# Patient Record
Sex: MALE | Race: WHITE
[De-identification: names, ages, dates, MRNs, and addresses within clinical notes are randomized per-mention and may not be internally consistent; named-entity substitution may affect disease eponyms.]

---

## 2018-02-09 ENCOUNTER — HOSPITAL ENCOUNTER (INPATIENT)
Dept: HOSPITAL 12 - ER | Age: 63
LOS: 3 days | Discharge: NURSING, REHAB OR LONG-TERM CARE | DRG: 871 | End: 2018-02-12
Payer: MEDICARE

## 2018-02-09 VITALS — WEIGHT: 294.02 LBS | HEIGHT: 70 IN | BODY MASS INDEX: 42.09 KG/M2

## 2018-02-09 VITALS — DIASTOLIC BLOOD PRESSURE: 54 MMHG | SYSTOLIC BLOOD PRESSURE: 127 MMHG

## 2018-02-09 DIAGNOSIS — I25.2: ICD-10-CM

## 2018-02-09 DIAGNOSIS — E86.9: ICD-10-CM
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
## 2018-02-09 DIAGNOSIS — Z79.84: ICD-10-CM

## 2018-02-09 DIAGNOSIS — K44.9: ICD-10-CM

## 2018-02-09 DIAGNOSIS — K21.9: ICD-10-CM

## 2018-02-09 DIAGNOSIS — M50.30: ICD-10-CM

## 2018-02-09 DIAGNOSIS — E87.3: ICD-10-CM

## 2018-02-09 DIAGNOSIS — Z59.0: ICD-10-CM

## 2018-02-09 DIAGNOSIS — E78.5: ICD-10-CM

## 2018-02-09 DIAGNOSIS — Z95.1: ICD-10-CM

## 2018-02-09 DIAGNOSIS — I25.10: ICD-10-CM

## 2018-02-09 DIAGNOSIS — Z79.899: ICD-10-CM

## 2018-02-09 DIAGNOSIS — B95.1: ICD-10-CM

## 2018-02-09 DIAGNOSIS — K80.20: ICD-10-CM

## 2018-02-09 DIAGNOSIS — G93.41: ICD-10-CM

## 2018-02-09 DIAGNOSIS — M25.78: ICD-10-CM

## 2018-02-09 DIAGNOSIS — E11.65: ICD-10-CM

## 2018-02-09 DIAGNOSIS — D64.9: ICD-10-CM

## 2018-02-09 DIAGNOSIS — N18.3: ICD-10-CM

## 2018-02-09 DIAGNOSIS — I13.10: ICD-10-CM

## 2018-02-09 DIAGNOSIS — E11.22: ICD-10-CM

## 2018-02-09 DIAGNOSIS — E66.01: ICD-10-CM

## 2018-02-09 DIAGNOSIS — A40.1: Primary | ICD-10-CM

## 2018-02-09 DIAGNOSIS — M47.9: ICD-10-CM

## 2018-02-09 DIAGNOSIS — R09.02: ICD-10-CM

## 2018-02-09 DIAGNOSIS — L03.116: ICD-10-CM

## 2018-02-09 DIAGNOSIS — Z79.82: ICD-10-CM
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
## 2018-02-09 DIAGNOSIS — M48.02: ICD-10-CM

## 2018-02-09 DIAGNOSIS — N17.0: ICD-10-CM

## 2018-02-09 LAB
ALP SERPL-CCNC: 86 U/L (ref 50–136)
ALT SERPL W/O P-5'-P-CCNC: 16 U/L (ref 16–63)
AMORPH URATE CRY URNS QL MICRO: (no result) /HPF
AMPHETAMINES UR QL SCN>1000 NG/ML: NEGATIVE
APAP SERPL-MCNC: < 2 UG/ML (ref 10–30)
APPEARANCE UR: (no result)
AST SERPL-CCNC: 14 U/L (ref 15–37)
BARBITURATES UR QL SCN: NEGATIVE
BASE EXCESS BLDA CALC-SCNC: 3.8 MMOL/L
BASOPHILS # BLD AUTO: 0 K/UL (ref 0–8)
BASOPHILS NFR BLD AUTO: 0.3 % (ref 0–2)
BILIRUB DIRECT SERPL-MCNC: 0.1 MG/DL (ref 0–0.2)
BILIRUB SERPL-MCNC: 0.4 MG/DL (ref 0.2–1)
BILIRUB UR QL STRIP: NEGATIVE
BUN SERPL-MCNC: 37 MG/DL (ref 7–18)
CHLORIDE SERPL-SCNC: 100 MMOL/L (ref 98–107)
CO2 SERPL-SCNC: 31 MMOL/L (ref 21–32)
COARSE GRAN CASTS URNS QL MICRO: (no result) /LPF
COCAINE UR QL SCN: NEGATIVE
COLOR UR: YELLOW
CREAT SERPL-MCNC: 1.9 MG/DL (ref 0.6–1.3)
DEPRECATED SQUAMOUS URNS QL MICRO: (no result) /HPF
EOSINOPHIL # BLD AUTO: 0.1 K/UL (ref 0–0.7)
EOSINOPHIL NFR BLD AUTO: 0.5 % (ref 0–7)
ETHANOL SERPL-MCNC: < 3 MG/DL (ref 0–0)
GLUCOSE SERPL-MCNC: 259 MG/DL (ref 74–106)
GLUCOSE UR STRIP-MCNC: (no result) MG/DL
HCO3 BLDA-SCNC: 27.1 MMOL/L
HCT VFR BLD AUTO: 31.7 % (ref 36.7–47.1)
HGB BLD-MCNC: 10.4 G/DL (ref 12.5–16.3)
HGB BLDA OXIMETRY-MCNC: 10.8 G/DL (ref 13.5–18)
INHALED O2 CONCENTRATION: 21 %
KETONES UR STRIP-MCNC: NEGATIVE MG/DL
LEUKOCYTE ESTERASE UR QL STRIP: NEGATIVE
LYMPHOCYTES # BLD AUTO: 0.3 K/UL (ref 20–40)
LYMPHOCYTES NFR BLD AUTO: 2.3 % (ref 20.5–51.5)
MCH RBC QN AUTO: 26.5 UUG (ref 23.8–33.4)
MCHC RBC AUTO-ENTMCNC: 33 G/DL (ref 32.5–36.3)
MCV RBC AUTO: 80.9 FL (ref 73–96.2)
MONOCYTES # BLD AUTO: 0.5 K/UL (ref 2–10)
MONOCYTES NFR BLD AUTO: 3.5 % (ref 0–11)
MUCOUS THREADS URNS QL MICRO: (no result) /LPF
NEUTROPHILS # BLD AUTO: 14 K/UL (ref 1.8–8.9)
NEUTROPHILS NFR BLD AUTO: 93.4 % (ref 38.5–71.5)
NITRITE UR QL STRIP: NEGATIVE
OPIATES UR QL SCN: NEGATIVE
PCO2 TEMP ADJ BLDA: 36 MMHG (ref 35–45)
PCP UR QL SCN>25 NG/ML: NEGATIVE
PH TEMP ADJ BLDA: 7.5 [PH] (ref 7.35–7.45)
PH UR STRIP: 5.5 [PH] (ref 5–8)
PLATELET # BLD AUTO: 216 K/UL (ref 152–348)
PO2 TEMP ADJ BLDA: 53.9 MMHG (ref 75–100)
POTASSIUM SERPL-SCNC: 4.2 MMOL/L (ref 3.5–5.1)
PROT UR QL STRIP: (no result)
RBC # BLD AUTO: 3.92 MIL/UL (ref 4.06–5.63)
RBC #/AREA URNS HPF: (no result) /HPF (ref 0–3)
SP GR UR STRIP: 1.01 (ref 1–1.03)
SPECIMEN DRAWN FROM PATIENT: (no result)
THC UR QL SCN>50 NG/ML: NEGATIVE
TSH SERPL DL<=0.005 MIU/L-ACNC: 1.02 MIU/ML (ref 0.36–3.74)
UROBILINOGEN UR STRIP-MCNC: 0.2 E.U./DL
WBC # BLD AUTO: 15 K/UL (ref 3.6–10.2)
WBC #/AREA URNS HPF: (no result) /HPF (ref 0–3)
WS STN SPEC: 7.1 G/DL (ref 6.4–8.2)

## 2018-02-09 PROCEDURE — G0480 DRUG TEST DEF 1-7 CLASSES: HCPCS

## 2018-02-09 PROCEDURE — C9113 INJ PANTOPRAZOLE SODIUM, VIA: HCPCS

## 2018-02-09 PROCEDURE — A4663 DIALYSIS BLOOD PRESSURE CUFF: HCPCS

## 2018-02-09 SDOH — ECONOMIC STABILITY - HOUSING INSECURITY: HOMELESSNESS: Z59.0

## 2018-02-09 NOTE — NUR
RECEIVED PT TO TELE FLOOR. PATIENT IS A&OX3 AT THIS TIME.  IT IS DIFFICULT TO COMPLETE 
ADMISSION INTERVIEW QUESTIONS DUE TO PATIENT SPEAKING AND UNABLE TO FINISH HIS SENTENCES. 
PATIENT HAS SLIGHT CONFUSION, UNABLE TO ANSWER QUESTIONS, BUT KNOWS HIS NAME AND BIRTHDAY 
AND WHERE HE IS.
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
## 2018-02-10 VITALS — DIASTOLIC BLOOD PRESSURE: 64 MMHG | SYSTOLIC BLOOD PRESSURE: 143 MMHG

## 2018-02-10 VITALS — SYSTOLIC BLOOD PRESSURE: 144 MMHG | DIASTOLIC BLOOD PRESSURE: 63 MMHG

## 2018-02-10 VITALS — SYSTOLIC BLOOD PRESSURE: 118 MMHG | DIASTOLIC BLOOD PRESSURE: 57 MMHG

## 2018-02-10 VITALS — DIASTOLIC BLOOD PRESSURE: 49 MMHG | SYSTOLIC BLOOD PRESSURE: 104 MMHG

## 2018-02-10 LAB
BASOPHILS # BLD AUTO: 0 K/UL (ref 0–8)
BASOPHILS NFR BLD AUTO: 0.2 % (ref 0–2)
BUN SERPL-MCNC: 33 MG/DL (ref 7–18)
CHLORIDE SERPL-SCNC: 101 MMOL/L (ref 98–107)
CHOLEST SERPL-MCNC: 119 MG/DL (ref ?–200)
CO2 SERPL-SCNC: 30 MMOL/L (ref 21–32)
CREAT SERPL-MCNC: 2 MG/DL (ref 0.6–1.3)
EOSINOPHIL # BLD AUTO: 0 K/UL (ref 0–0.7)
EOSINOPHIL NFR BLD AUTO: 0 % (ref 0–7)
GLUCOSE SERPL-MCNC: 173 MG/DL (ref 74–106)
HCT VFR BLD AUTO: 30.8 % (ref 36.7–47.1)
HDLC SERPL-MCNC: 40 MG/DL (ref 40–60)
HGB BLD-MCNC: 10 G/DL (ref 12.5–16.3)
LYMPHOCYTES # BLD AUTO: 0.4 K/UL (ref 20–40)
LYMPHOCYTES NFR BLD AUTO: 1.9 % (ref 20.5–51.5)
MAGNESIUM SERPL-MCNC: 1.3 MG/DL (ref 1.8–2.4)
MCH RBC QN AUTO: 26.2 UUG (ref 23.8–33.4)
MCHC RBC AUTO-ENTMCNC: 32 G/DL (ref 32.5–36.3)
MCV RBC AUTO: 81.2 FL (ref 73–96.2)
MONOCYTES # BLD AUTO: 0.9 K/UL (ref 2–10)
MONOCYTES NFR BLD AUTO: 3.8 % (ref 0–11)
NEUTROPHILS # BLD AUTO: 21.5 K/UL (ref 1.8–8.9)
NEUTROPHILS NFR BLD AUTO: 94.1 % (ref 38.5–71.5)
PHOSPHATE SERPL-MCNC: 2.7 MG/DL (ref 2.5–4.9)
PLATELET # BLD AUTO: 217 K/UL (ref 152–348)
POTASSIUM SERPL-SCNC: 4.3 MMOL/L (ref 3.5–5.1)
RBC # BLD AUTO: 3.8 MIL/UL (ref 4.06–5.63)
TRIGL SERPL-MCNC: 83 MG/DL (ref 30–150)
TSH SERPL DL<=0.005 MIU/L-ACNC: 0.47 MIU/ML (ref 0.36–3.74)
WBC # BLD AUTO: 22.8 K/UL (ref 3.6–10.2)

## 2018-02-10 RX ADMIN — ACETAMINOPHEN PRN MG: 325 TABLET ORAL at 16:29

## 2018-02-10 RX ADMIN — DEXTROSE AND SODIUM CHLORIDE PRN MLS/HR: 5; .45 INJECTION, SOLUTION INTRAVENOUS at 00:26

## 2018-02-10 RX ADMIN — MAGNESIUM SULFATE IN DEXTROSE SCH MLS/HR: 10 INJECTION, SOLUTION INTRAVENOUS at 13:39

## 2018-02-10 RX ADMIN — MAGNESIUM SULFATE IN DEXTROSE SCH MLS/HR: 10 INJECTION, SOLUTION INTRAVENOUS at 12:40

## 2018-02-10 RX ADMIN — Medication SCH EACH: at 20:54

## 2018-02-10 RX ADMIN — GABAPENTIN SCH MG: 300 CAPSULE ORAL at 08:23

## 2018-02-10 RX ADMIN — Medication SCH MG: at 08:22

## 2018-02-10 RX ADMIN — ATORVASTATIN CALCIUM SCH MG: 40 TABLET, FILM COATED ORAL at 20:51

## 2018-02-10 RX ADMIN — DEXTROSE SCH MG: 50 INJECTION, SOLUTION INTRAVENOUS at 06:04

## 2018-02-10 RX ADMIN — ASPIRIN SCH MG: 81 TABLET, COATED ORAL at 08:23

## 2018-02-10 RX ADMIN — DEXTROSE AND SODIUM CHLORIDE PRN MLS/HR: 5; .45 INJECTION, SOLUTION INTRAVENOUS at 16:29

## 2018-02-10 RX ADMIN — DOXAZOSIN MESYLATE SCH MG: 2 TABLET ORAL at 20:53

## 2018-02-10 RX ADMIN — CARVEDILOL SCH MG: 25 TABLET, FILM COATED ORAL at 08:23

## 2018-02-10 RX ADMIN — GABAPENTIN SCH MG: 300 CAPSULE ORAL at 16:29

## 2018-02-10 RX ADMIN — SODIUM CHLORIDE PRN UNIT: 9 INJECTION, SOLUTION INTRAVENOUS at 21:10

## 2018-02-10 RX ADMIN — CARVEDILOL SCH MG: 25 TABLET, FILM COATED ORAL at 16:30

## 2018-02-10 NOTE — NUR
patient pulled out IV to left hand while sitting up in bed. Inserted a new 22g IV to the 
right hand. Patent and flushing well.

## 2018-02-10 NOTE — NUR
PHARMACY CLINICAL NOTES: (VANCOMYCIN DOSING)



S: 61 yo male MD ordered Vancomycin for empiric treatment, Urine cx and Blood cx are pending



O: BUN/SCR 33/2.0 ; WBC 22.8; TEMP 99.7, DOSING  LBS (SCR ON 2/9 1.9)



A/P:  Since renal fxn not very stable will dose vanco by fall of level . will dose 
Vancomycin 2000 mg x 1 tonight . RX will check renal fxn tomorrow and will order level 
accordingly. Will continue to monitor

## 2018-02-10 NOTE — NUR
PATIENT ALERT,ORIENTED ,IN NO ACUTE DISTRESS,/64,LOW GRADE TEMP,RECEIVING VANCOMYCIN 
IV TOLERATED WELL,FALL PRECAUTIONS, REINFORCED PATIENT TO CALL FOR ASSISTANCE, SR WITH PAC 
ON MONITOR.

## 2018-02-10 NOTE — NUR
Pt is shivering, c/o of generalized pain, vitals taken temperature 101.5, /73. sepsis 
protocol initiated. MD notified, charge nurse notified. Bolus held per MD order. Cooling 
measures initiated , ice pack applied, Tylenol suppository given, BP meds given. Continue to 
monitor.

## 2018-02-10 NOTE — NUR
PT REASSESSED. TEMP 99.8, /52, PT NO LONGER SHIVERING, PAIN 2/10, NO SIGNS OF 
DISTRESS. CONTINUE TO MONITOR.

## 2018-02-11 VITALS — SYSTOLIC BLOOD PRESSURE: 118 MMHG | DIASTOLIC BLOOD PRESSURE: 57 MMHG

## 2018-02-11 VITALS — SYSTOLIC BLOOD PRESSURE: 119 MMHG | DIASTOLIC BLOOD PRESSURE: 68 MMHG

## 2018-02-11 VITALS — DIASTOLIC BLOOD PRESSURE: 58 MMHG | SYSTOLIC BLOOD PRESSURE: 145 MMHG

## 2018-02-11 VITALS — SYSTOLIC BLOOD PRESSURE: 130 MMHG | DIASTOLIC BLOOD PRESSURE: 73 MMHG

## 2018-02-11 VITALS — SYSTOLIC BLOOD PRESSURE: 118 MMHG | DIASTOLIC BLOOD PRESSURE: 53 MMHG

## 2018-02-11 LAB
BASOPHILS # BLD AUTO: 0 K/UL (ref 0–8)
BASOPHILS NFR BLD AUTO: 0.1 % (ref 0–2)
BUN SERPL-MCNC: 38 MG/DL (ref 7–18)
CHLORIDE SERPL-SCNC: 100 MMOL/L (ref 98–107)
CO2 SERPL-SCNC: 28 MMOL/L (ref 21–32)
CREAT SERPL-MCNC: 2.2 MG/DL (ref 0.6–1.3)
EOSINOPHIL # BLD AUTO: 0.1 K/UL (ref 0–0.7)
EOSINOPHIL NFR BLD AUTO: 0.6 % (ref 0–7)
GLUCOSE SERPL-MCNC: 202 MG/DL (ref 74–106)
HCT VFR BLD AUTO: 28.4 % (ref 36.7–47.1)
HGB BLD-MCNC: 9.2 G/DL (ref 12.5–16.3)
LYMPHOCYTES # BLD AUTO: 0.8 K/UL (ref 20–40)
LYMPHOCYTES NFR BLD AUTO: 6.3 % (ref 20.5–51.5)
MAGNESIUM SERPL-MCNC: 1.9 MG/DL (ref 1.8–2.4)
MCH RBC QN AUTO: 26.3 UUG (ref 23.8–33.4)
MCHC RBC AUTO-ENTMCNC: 32 G/DL (ref 32.5–36.3)
MCV RBC AUTO: 81.4 FL (ref 73–96.2)
MONOCYTES # BLD AUTO: 0.9 K/UL (ref 2–10)
MONOCYTES NFR BLD AUTO: 7.2 % (ref 0–11)
NEUTROPHILS # BLD AUTO: 11.1 K/UL (ref 1.8–8.9)
NEUTROPHILS NFR BLD AUTO: 85.8 % (ref 38.5–71.5)
PLATELET # BLD AUTO: 178 K/UL (ref 152–348)
POTASSIUM SERPL-SCNC: 3.9 MMOL/L (ref 3.5–5.1)
RBC # BLD AUTO: 3.49 MIL/UL (ref 4.06–5.63)
WBC # BLD AUTO: 12.9 K/UL (ref 3.6–10.2)

## 2018-02-11 RX ADMIN — SODIUM CHLORIDE PRN UNIT: 9 INJECTION, SOLUTION INTRAVENOUS at 07:55

## 2018-02-11 RX ADMIN — DEXTROSE AND SODIUM CHLORIDE PRN MLS/HR: 5; .45 INJECTION, SOLUTION INTRAVENOUS at 13:55

## 2018-02-11 RX ADMIN — CARVEDILOL SCH MG: 25 TABLET, FILM COATED ORAL at 16:01

## 2018-02-11 RX ADMIN — DEXTROSE SCH MLS/HR: 50 INJECTION, SOLUTION INTRAVENOUS at 23:02

## 2018-02-11 RX ADMIN — SODIUM CHLORIDE PRN UNIT: 9 INJECTION, SOLUTION INTRAVENOUS at 21:02

## 2018-02-11 RX ADMIN — DOXAZOSIN MESYLATE SCH MG: 2 TABLET ORAL at 20:29

## 2018-02-11 RX ADMIN — Medication SCH EACH: at 15:55

## 2018-02-11 RX ADMIN — ACETAMINOPHEN PRN MG: 325 TABLET ORAL at 20:29

## 2018-02-11 RX ADMIN — SODIUM CHLORIDE PRN UNIT: 9 INJECTION, SOLUTION INTRAVENOUS at 16:10

## 2018-02-11 RX ADMIN — SODIUM CHLORIDE PRN UNIT: 9 INJECTION, SOLUTION INTRAVENOUS at 11:28

## 2018-02-11 RX ADMIN — Medication SCH EACH: at 20:33

## 2018-02-11 RX ADMIN — GABAPENTIN SCH MG: 300 CAPSULE ORAL at 16:01

## 2018-02-11 RX ADMIN — Medication SCH EACH: at 05:59

## 2018-02-11 RX ADMIN — CARVEDILOL SCH MG: 25 TABLET, FILM COATED ORAL at 08:03

## 2018-02-11 RX ADMIN — ASPIRIN SCH MG: 81 TABLET, COATED ORAL at 08:01

## 2018-02-11 RX ADMIN — Medication SCH MG: at 08:02

## 2018-02-11 RX ADMIN — Medication SCH EACH: at 11:15

## 2018-02-11 RX ADMIN — ACETAMINOPHEN PRN MG: 325 TABLET ORAL at 05:31

## 2018-02-11 RX ADMIN — ATORVASTATIN CALCIUM SCH MG: 40 TABLET, FILM COATED ORAL at 20:29

## 2018-02-11 RX ADMIN — GABAPENTIN SCH MG: 300 CAPSULE ORAL at 08:03

## 2018-02-11 RX ADMIN — DEXTROSE SCH MG: 50 INJECTION, SOLUTION INTRAVENOUS at 06:02

## 2018-02-11 NOTE — NUR
PHARMACY CLINICAL NOTES: (VANCOMYCIN DOSING)



S: 63 yo male MD ordered Vancomycin for BACTEREMIA/LEUKOCYTOSIS

O: BUN/SCR 38/2.2 ; WBC 12.9;   TEMP 100.4, DOSING  LBS 



A/P:  Since renal fxn not very stable, will continue to dose vanco by fall of level .  
Vancomycin 2000 mg x 1 given yesterday at 1900.   Vancomycin random is on order today at 
1530. Will follow the level for further dosing.

-------------------------------------------------------------------------------

Addendum: 02/11/18 at 1712 by VIDAL ZULUAGA

-------------------------------------------------------------------------------

VANCOMYCIN RANDOM AT 1530 WAS 12.7. WILL GIVE [id number] TODAY AND CHECK RANDOM TOMORROW AT 
1400.

## 2018-02-11 NOTE — NUR
PATIENT IS AWAKE IN BED, HE DENIES PAIN OR ANY DISCOMFORT. HE HAD A LOW GRADE FEVER 99.6, 
TYLENOL GIVEN WITH EFFECT. ALL SAFETY MEASURES IN PLACE, CALL LIGHT WITHIN PATIENT'S REACH. 
WILL CONTINUE TO MONITOR PATIENT

## 2018-02-12 VITALS — DIASTOLIC BLOOD PRESSURE: 62 MMHG | SYSTOLIC BLOOD PRESSURE: 117 MMHG

## 2018-02-12 VITALS — DIASTOLIC BLOOD PRESSURE: 62 MMHG | SYSTOLIC BLOOD PRESSURE: 115 MMHG

## 2018-02-12 VITALS — SYSTOLIC BLOOD PRESSURE: 107 MMHG | DIASTOLIC BLOOD PRESSURE: 53 MMHG

## 2018-02-12 VITALS — DIASTOLIC BLOOD PRESSURE: 66 MMHG | SYSTOLIC BLOOD PRESSURE: 135 MMHG

## 2018-02-12 VITALS — DIASTOLIC BLOOD PRESSURE: 54 MMHG | SYSTOLIC BLOOD PRESSURE: 128 MMHG

## 2018-02-12 LAB
ALP SERPL-CCNC: 58 U/L (ref 50–136)
ALT SERPL W/O P-5'-P-CCNC: 11 U/L (ref 16–63)
AST SERPL-CCNC: 16 U/L (ref 15–37)
BILIRUB SERPL-MCNC: 0.3 MG/DL (ref 0.2–1)
BUN SERPL-MCNC: 44 MG/DL (ref 7–18)
CHLORIDE SERPL-SCNC: 99 MMOL/L (ref 98–107)
CO2 SERPL-SCNC: 28 MMOL/L (ref 21–32)
CREAT SERPL-MCNC: 2.2 MG/DL (ref 0.6–1.3)
GLUCOSE SERPL-MCNC: 222 MG/DL (ref 74–106)
POTASSIUM SERPL-SCNC: 4 MMOL/L (ref 3.5–5.1)
WS STN SPEC: 6.4 G/DL (ref 6.4–8.2)

## 2018-02-12 RX ADMIN — Medication SCH EACH: at 11:50

## 2018-02-12 RX ADMIN — DEXTROSE SCH MLS/HR: 50 INJECTION, SOLUTION INTRAVENOUS at 05:16

## 2018-02-12 RX ADMIN — CARVEDILOL SCH MG: 25 TABLET, FILM COATED ORAL at 17:10

## 2018-02-12 RX ADMIN — Medication SCH MG: at 08:03

## 2018-02-12 RX ADMIN — GABAPENTIN SCH MG: 300 CAPSULE ORAL at 17:09

## 2018-02-12 RX ADMIN — ASPIRIN SCH MG: 81 TABLET, COATED ORAL at 08:03

## 2018-02-12 RX ADMIN — SODIUM CHLORIDE PRN UNIT: 9 INJECTION, SOLUTION INTRAVENOUS at 08:06

## 2018-02-12 RX ADMIN — SODIUM CHLORIDE PRN UNIT: 9 INJECTION, SOLUTION INTRAVENOUS at 17:27

## 2018-02-12 RX ADMIN — Medication SCH EACH: at 06:33

## 2018-02-12 RX ADMIN — GABAPENTIN SCH MG: 300 CAPSULE ORAL at 08:02

## 2018-02-12 RX ADMIN — Medication SCH EACH: at 17:25

## 2018-02-12 RX ADMIN — SODIUM CHLORIDE PRN UNIT: 9 INJECTION, SOLUTION INTRAVENOUS at 11:48

## 2018-02-12 RX ADMIN — CARVEDILOL SCH MG: 25 TABLET, FILM COATED ORAL at 08:03

## 2018-02-12 NOTE — NUR
PATIENT PICKED UP VIA AMBULANCE FOR DISCHARGE. LEFT FACILITY IN STABLE CONDITION. ALL NEEDS 
ATTENDED.

## 2018-02-12 NOTE — NUR
PATIENT SLEPT THROUGH MOST OF THE NIGHT, NO C/O OF PAIN OR  RESP DISTRESS. SAFETY MEASURES 
MAINTAINED, CALL LIGHT LEFT WITHIN PATIENT'S REACH. ALL NEEDS MET

## 2018-02-12 NOTE — NUR
PATIENT AWAKE IN BED WAITING FOR AMBULANCE PICKUP. PATIENT IS A/O X4. DENIES PAIN OR 
DISCOMFORT. NO RESP. DISTRESS NOTED. CALL LIGHT IN REACH. ALL NEEDS ATTENDED.

## 2018-02-12 NOTE — NUR
PT WILL BE D/C TO [facility] VIA AMBULANCE. AMBULANCE HAS NOT ARRIVED. REPORT GIVEN TO 
REGINA CARR AT FACILITY. FACILITY REQUESTS THAT PT IV STAY INTACT.PT STABLE TO D/C. PT WILL D/C 
WITH ALL BELONGINGS, VALUABLES, MEDICATIONS, AND EXIT-CARE PACKET.  WILL ENDORSE TO NIGHT 
SHIFT.
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
